# Patient Record
(demographics unavailable — no encounter records)

---

## 2025-07-02 NOTE — HISTORY OF PRESENT ILLNESS
[Patient reported PAP Smear was normal] : Patient reported PAP Smear was normal [HIV test declined] : HIV test declined [Syphilis test declined] : Syphilis test declined [Gonorrhea test offered] : Gonorrhea test offered [Chlamydia test offered] : Chlamydia test offered [Trichomonas test declined] : Trichomonas test declined [HPV test declined] : HPV test declined [Hepatitis B test declined] : Hepatitis B test declined [Hepatitis C test declined] : Hepatitis C test declined [Oral Contraceptive] : uses oral contraception pills [Y] : Patient is sexually active [N] : Patient denies prior pregnancies [TextBox_4] : 24 yo G0 sexually active F on L[employer]era for BCM presents for well woman exam. Was given Generic last time and would prefer brand name.  Denies any adverse effects of OCP. Family hx breast CA in MGM and was BRCA neg. Family hx colon cancer and will need to initiate colorectal screening earlier than 45. Completed Gardasil series  [PapSmeardate] : 4/24 [LMPDate] : 6/28/25